# Patient Record
Sex: FEMALE | Race: OTHER | HISPANIC OR LATINO | Employment: UNEMPLOYED | URBAN - METROPOLITAN AREA
[De-identification: names, ages, dates, MRNs, and addresses within clinical notes are randomized per-mention and may not be internally consistent; named-entity substitution may affect disease eponyms.]

---

## 2024-10-04 ENCOUNTER — OFFICE VISIT (OUTPATIENT)
Dept: FAMILY MEDICINE | Facility: CLINIC | Age: 44
End: 2024-10-04
Payer: COMMERCIAL

## 2024-10-04 VITALS
OXYGEN SATURATION: 99 % | WEIGHT: 131.81 LBS | HEIGHT: 63 IN | HEART RATE: 67 BPM | DIASTOLIC BLOOD PRESSURE: 78 MMHG | BODY MASS INDEX: 23.36 KG/M2 | SYSTOLIC BLOOD PRESSURE: 118 MMHG | TEMPERATURE: 98 F

## 2024-10-04 DIAGNOSIS — Z12.31 ENCOUNTER FOR SCREENING MAMMOGRAM FOR BREAST CANCER: ICD-10-CM

## 2024-10-04 DIAGNOSIS — Z00.00 ANNUAL PHYSICAL EXAM: Primary | ICD-10-CM

## 2024-10-04 DIAGNOSIS — M51.361 DEGENERATION OF INTERVERTEBRAL DISC OF LUMBAR REGION WITH LOWER EXTREMITY PAIN: ICD-10-CM

## 2024-10-04 DIAGNOSIS — R53.83 FATIGUE, UNSPECIFIED TYPE: ICD-10-CM

## 2024-10-04 DIAGNOSIS — N94.6 DYSMENORRHEA: ICD-10-CM

## 2024-10-04 PROCEDURE — 99386 PREV VISIT NEW AGE 40-64: CPT | Mod: 25,S$GLB,, | Performed by: FAMILY MEDICINE

## 2024-10-04 PROCEDURE — 1159F MED LIST DOCD IN RCRD: CPT | Mod: CPTII,S$GLB,, | Performed by: FAMILY MEDICINE

## 2024-10-04 PROCEDURE — 3044F HG A1C LEVEL LT 7.0%: CPT | Mod: CPTII,S$GLB,, | Performed by: FAMILY MEDICINE

## 2024-10-04 PROCEDURE — 96372 THER/PROPH/DIAG INJ SC/IM: CPT | Mod: S$GLB,,, | Performed by: FAMILY MEDICINE

## 2024-10-04 PROCEDURE — 3078F DIAST BP <80 MM HG: CPT | Mod: CPTII,S$GLB,, | Performed by: FAMILY MEDICINE

## 2024-10-04 PROCEDURE — 3074F SYST BP LT 130 MM HG: CPT | Mod: CPTII,S$GLB,, | Performed by: FAMILY MEDICINE

## 2024-10-04 PROCEDURE — 99999 PR PBB SHADOW E&M-NEW PATIENT-LVL IV: CPT | Mod: PBBFAC,,, | Performed by: FAMILY MEDICINE

## 2024-10-04 PROCEDURE — 99204 OFFICE O/P NEW MOD 45 MIN: CPT | Mod: 25,S$GLB,, | Performed by: FAMILY MEDICINE

## 2024-10-04 PROCEDURE — 3008F BODY MASS INDEX DOCD: CPT | Mod: CPTII,S$GLB,, | Performed by: FAMILY MEDICINE

## 2024-10-04 PROCEDURE — 1160F RVW MEDS BY RX/DR IN RCRD: CPT | Mod: CPTII,S$GLB,, | Performed by: FAMILY MEDICINE

## 2024-10-04 RX ORDER — FAMOTIDINE 40 MG/1
40 TABLET, FILM COATED ORAL DAILY
Qty: 30 TABLET | Refills: 5 | Status: SHIPPED | OUTPATIENT
Start: 2024-10-04 | End: 2025-10-04

## 2024-10-04 RX ORDER — CELECOXIB 100 MG/1
100 CAPSULE ORAL 2 TIMES DAILY
Qty: 60 CAPSULE | Refills: 5 | Status: SHIPPED | OUTPATIENT
Start: 2024-10-04

## 2024-10-04 RX ORDER — METHYLPREDNISOLONE 4 MG/1
TABLET ORAL
Qty: 21 EACH | Refills: 0 | Status: SHIPPED | OUTPATIENT
Start: 2024-10-04 | End: 2024-10-25

## 2024-10-04 RX ORDER — CYANOCOBALAMIN 1000 UG/ML
1000 INJECTION, SOLUTION INTRAMUSCULAR; SUBCUTANEOUS ONCE
Status: COMPLETED | OUTPATIENT
Start: 2024-10-04 | End: 2024-10-04

## 2024-10-04 RX ADMIN — CYANOCOBALAMIN 1000 MCG: 1000 INJECTION, SOLUTION INTRAMUSCULAR; SUBCUTANEOUS at 03:10

## 2024-10-04 NOTE — PROGRESS NOTES
Patient given B12 1000mcg via injection to L glute per MD order. Tolerated well, 0 complaints of.

## 2024-10-04 NOTE — PROGRESS NOTES
Routine Office Visit     Patient Name: Flako Lake    : 1980  MRN: [id number]    Subjective      - Keegan [id number]    History of Present Illness    CHIEF COMPLAINT:  Annual exam / establish care / new to me   Modified visit - topics discussed outside of annual physical    - Abnormal menstrual bleeding and menopausal symptoms  - Back pain     MENOPAUSE AND MENSTRUAL ISSUES:  She was diagnosed with premenopause at age 37, with a family history of early menopause (sisters at ages 40 and 41). Now 44, she experiences perimenopause symptoms including hot flashes, night sweats, and mood swings. She reports difficulty sleeping due to alternating hot and cold sensations at night and significant mood fluctuations. Her menstrual cycles are irregular with alternating heavy and light periods. She reports current heavy bleeding which began yesterday, with her last period occurring three months ago and lasting only 1-2 hours. She previously tried birth control in [facility] at age 28 but discontinued after two months due to poor tolerance. Homeopathic medicine tried in [place] for menopausal symptoms was ineffective.    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 An MRI in [place] about a year ago revealed inflamed nerves causing disc compression. She experiences intermittent symptoms exacerbated by climbing stairs, walking forcefully, or wearing high heels. She reports numbness in her legs starting a year ago and recent onset of coldness from feet to hips. B12 injections have been helpful in managing symptoms. She denies problems with urination or bowel movements. Family history of similar spinal issues affects her father and siblings. Symptoms began at age 15 or 16.    PAIN MANAGEMENT:  She has been using naproxen for pain management but reports stomach upset, even with gastric protectors. She is  "seeking an alternative pain medication. During a previous hospitalization, she received IV fentanyl causing significant sedation for 4-5 days, along with steroid treatment.    CURRENT MEDICATIONS AND SUPPLEMENTS:  She takes omega-3 and biotin supplements intermittently, typically finishing one bottle before stopping. She also mentions taking unspecified vitamins.    LIFESTYLE AND SOCIAL HISTORY:  Her diet consists primarily of fish, avoiding meat and chicken. She engages in daily 15-20 minute walks with her dog but is unable to use a treadmill due to spine issues. She denies alcohol consumption and is a non-smoker. She frequently travels due to her 's job in [employer], having previously lived in [place] and [place]. She plans to remain in her current location for 4-5 years. She takes annual trips to [place] for medical testing including hematology, cytology, and mammograms.    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    ALLERGIES:  She denies any known allergies.      ROS:  General: no fever, no chills, no fatigue, no weight gain, no weight loss, +night sweats  Eyes: no vision changes, no redness, no discharge  ENT: no ear pain, no nasal congestion, no sore throat  Cardiovascular: no chest pain, no palpitations, no lower extremity edema  Respiratory: no cough, no shortness of breath  Gastrointestinal: no abdominal pain, no nausea, no vomiting, no diarrhea, no constipation, no blood in stool  Genitourinary: no dysuria, no hematuria, no frequency  Musculoskeletal: no joint pain, no muscle pain  Skin: no rash, no lesion  Neurological: no headache, no dizziness, +numbness, no tingling  Psychiatric: no anxiety, no depression, +sleep difficulty  Allergic: no seasonal allergies           Objective     /78   Pulse 67   Temp 98 °F (36.7 °C) (Oral)   Ht 5' 3" (1.6 m)   Wt 59.8 kg (131 lb 13.4 oz)   LMP 10/03/2024 (Exact Date)   SpO2 99%   BMI 23.35 kg/m² "   Physical Exam  Constitutional:       Appearance: She is well-developed.   HENT:      Head: Normocephalic and atraumatic.   Eyes:      Conjunctiva/sclera: Conjunctivae normal.      Pupils: Pupils are equal, round, and reactive to light.   Cardiovascular:      Rate and Rhythm: Normal rate and regular rhythm.      Heart sounds: Normal heart sounds. No murmur heard.     No friction rub. No gallop.   Pulmonary:      Effort: No respiratory distress.      Breath sounds: Normal breath sounds.   Abdominal:      General: Bowel sounds are normal. There is no distension.      Palpations: Abdomen is soft.      Tenderness: There is no abdominal tenderness.   Musculoskeletal:         General: Normal range of motion.      Cervical back: Normal range of motion and neck supple.   Lymphadenopathy:      Cervical: No cervical adenopathy.   Skin:     General: Skin is warm.   Neurological:      Mental Status: She is alert and oriented to person, place, and time.           Assessment     Assessment & Plan      OSTEOPOROSIS RISK:   Discussed potential link between early menopause and osteoporosis.    EXERCISE:   Patient to continue current walking routine of 15-20 minutes per day with dog.    NUTRITION:   Patient to maintain current healthy diet, including fish consumption.    VITAMIN B12 DEFICIENCY:   Started B12 injection to be administered once in office for symptom management.    ABNORMAL UTERINE BLEEDING:   Referred to OB/GYN for Pap smear and further evaluation of abnormal bleeding.            Problem List Items Addressed This Visit    None  Visit Diagnoses       Annual physical exam    -  Primary    Relevant Orders    CBC Auto Differential (Completed)    Comprehensive Metabolic Panel (Completed)    Lipid Panel (Completed)    Hemoglobin A1C (Completed)    TSH (Completed)    Hepatitis C Antibody (Completed)    HIV 1/2 Ag/Ab (4th Gen) (Completed)    Ambulatory referral/consult to Obstetrics / Gynecology  I addressed all major concerns  as it related to health maintenance.  All were ordered and scheduled based on the patients wishes.  Any additional health maintenance will be readdressed at the next physical if declined or deferred by the patient.       Dysmenorrhea        Relevant Orders    LUTEINIZING HORMONE (Completed)    FOLLICLE STIMULATING HORMONE (Completed)  Ambulatory referral/consult to Obstetrics / Gynecology   Considered early menopause diagnosis at age 37, noting family history and current symptoms including hot flashes and mood changes      Encounter for screening mammogram for breast cancer        Relevant Orders    Mammo Digital Screening Bilat w/ Maged    Degeneration of intervertebral disc of lumbar region with lower extremity pain        Relevant Medications    celecoxib (CELEBREX) 100 MG capsule    famotidine (PEPCID) 40 MG tablet    methylPREDNISolone (MEDROL DOSEPACK) 4 mg tablet   Started Celebrex as an alternative to naproxen for pain management.   Started oral steroids, to be taken only if needed for severe back pain.   Explained that nerve compression may be causing leg coldness and numbness.   Assessed chronic back pain and numbness in legs, potentially related to spinal disc issues and nerve compression      Fatigue, unspecified type        Relevant Medications    cyanocobalamin injection 1,000 mcg (Completed)            FOLLOW UP:   Follow up in 1st week of December to review test results and provide documentation for the patient's doctor in [place].   Contact the office if back pain worsens or if steroids are needed.     Greater than 45 minutes was spent with this patient with greater than 50% spent with face-to-face counseling     This note was generated with the assistance of ambient listening technology. Verbal consent was obtained by the patient and accompanying visitor(s) for the recording of patient appointment to facilitate this note. I attest to having reviewed and edited the generated note for accuracy, though  some syntax or spelling errors may persist. Please contact the author of this note for any clarification.

## 2024-10-08 ENCOUNTER — HOSPITAL ENCOUNTER (OUTPATIENT)
Dept: RADIOLOGY | Facility: HOSPITAL | Age: 44
Discharge: HOME | End: 2024-10-08
Attending: FAMILY MEDICINE
Payer: COMMERCIAL

## 2024-10-08 DIAGNOSIS — Z12.31 ENCOUNTER FOR SCREENING MAMMOGRAM FOR BREAST CANCER: ICD-10-CM

## 2024-10-08 PROCEDURE — 77067 SCR MAMMO BI INCL CAD: CPT | Mod: TC,PO

## 2024-10-15 NOTE — PROGRESS NOTES
HISTORY OF PRESENT ILLNESS:    Flako Lake is a 44 y.o. female, , Patient's last menstrual period was 10/03/2024 (exact date).,  presents for a routine exam. She uses nothing for contraception. She does not want STD screening.  Reports GYN complaints.  Reports hot flashes since age 37. Mom went through menopause at 45, sisters ate 40 and 50. She is still having monthly, regular cycles.   The patient participates in regular exercise: yes.  The patient does not smoke.  The patient wears seatbelts.   Pt denies any domestic violence. no -  tattoos or blood transfusions    SCREENING:   Pap: due   Mammogram:  , TC Score: 6.67 %   Colonoscopy: N/A   DEXA:  N/A     GYN FH:  Breast cancer: none  Colon cancer: none  Ovarian cancer: none  Endometrial cancer: none      PCP VISIT   She was diagnosed with premenopause at age 37, with a family history of early menopause (sisters at ages 40 and 41). Now 44, she experiences perimenopause symptoms including hot flashes, night sweats, and mood swings. She reports difficulty sleeping due to alternating hot and cold sensations at night and significant mood fluctuations. Her menstrual cycles are irregular with alternating heavy and light periods. She reports current heavy bleeding which began yesterday, with her last period occurring three months ago and lasting only 1-2 hours. She previously tried birth control in [facility] at age 28 but discontinued after two months due to poor tolerance. Homeopathic medicine tried in [place] for menopausal symptoms was ineffective.    History reviewed. No pertinent past medical history.    History reviewed. No pertinent surgical history.    MEDICATIONS AND ALLERGIES:      Current Outpatient Medications:     celecoxib (CELEBREX) 100 MG capsule, Take 1 capsule (100 mg total) by mouth 2 (two) times daily., Disp: 60 capsule, Rfl: 5    famotidine (PEPCID) 40 MG tablet, Take 1 tablet (40 mg total) by mouth once daily., Disp: 30 tablet, Rfl:  5    methylPREDNISolone (MEDROL DOSEPACK) 4 mg tablet, use as directed, Disp: 21 each, Rfl: 0    Review of patient's allergies indicates:  No Known Allergies    Social History     Socioeconomic History    Marital status: Unknown   Tobacco Use    Smoking status: Never    Smokeless tobacco: Never     Social Drivers of Health     Financial Resource Strain: Low Risk  (9/27/2024)    Overall Financial Resource Strain (CARDIA)     Difficulty of Paying Living Expenses: Not very hard   Food Insecurity: No Food Insecurity (9/27/2024)    Hunger Vital Sign     Worried About Running Out of Food in the Last Year: Never true     Ran Out of Food in the Last Year: Never true   Physical Activity: Insufficiently Active (9/27/2024)    Exercise Vital Sign     Days of Exercise per Week: 7 days     Minutes of Exercise per Session: 20 min   Stress: No Stress Concern Present (9/27/2024)    [employer] [place] [employer] - Occupational Stress Questionnaire     Feeling of Stress : Not at all   Housing Stability: Unknown (9/27/2024)    Housing Stability Vital Sign     Unable to Pay for Housing in the Last Year: No       COMPREHENSIVE GYN HISTORY:    PAP History: Denies abnormal Paps.  Infection History: Denies STDs. Denies PID.  Benign History: Denies uterine fibroids. Denies ovarian cysts. Denies endometriosis. Denies other conditions.  Cancer History: Denies cervical cancer. Denies uterine cancer or hyperplasia. Denies ovarian cancer. Denies vulvar cancer or pre-cancer. Denies vaginal cancer or pre-cancer. Denies breast cancer. Denies colon cancer.  Sexual Activity History: Reports currently being sexually active  Menstrual History: Monthly, mild-moderate.  Contraception:no method    ROS:  GENERAL: No weight changes. No swelling. No fatigue. No fever.  CARDIOVASCULAR: No chest pain. No shortness of breath. No leg cramps.   NEUROLOGICAL: No headaches. No vision changes.  BREASTS: No pain. No lumps. No discharge.  ABDOMEN: No pain.  No nausea. No vomiting. No diarrhea. No constipation.  REPRODUCTIVE: No abnormal bleeding.   VULVA: No pain. No lesions. No itching.  VAGINA: No relaxation. No itching. No odor. No discharge. No lesions.  URINARY: No incontinence. No nocturia. No frequency. No dysuria.    /78   Wt 58 kg (127 lb 13.9 oz)   LMP 10/03/2024 (Exact Date)   BMI 22.65 kg/m²     PE:  APPEARANCE: Well nourished, well developed, in no acute distress.  AFFECT: WNL, alert and oriented x 3.  SKIN: No acne or hirsutism.  NECK: Neck symmetric, without masses or thyromegaly.  NODES: No inguinal, cervical, axillary or femoral lymph node enlargement.  CHEST: Good respiratory effort.   ABDOMEN: Soft. No tenderness or masses. No hepatosplenomegaly. No hernias.  BREASTS: Symmetrical, no skin changes, visible lesions, palpable masses or nipple discharge bilaterally.  PELVIC: External female genitalia without lesions.  Female hair distribution. Adequate perineal body, Normal urethral meatus. Vagina moist and well rugated without lesions or discharge.  No significant cystocele or rectocele present. Cervix pink without lesions, discharge or tenderness. Uterus is normal size, regular, mobile and nontender. Adnexa without masses or tenderness.  EXTREMITIES: No edema      DIAGNOSIS:  1. Women's annual routine gynecological examination        2. Screening breast examination        3. Encounter for Papanicolaou smear for cervical cancer screening  Liquid-Based Pap Smear, Screening      4. Screening for human papillomavirus (HPV)  HPV High Risk Genotypes, PCR      5. Perimenopausal        6. Annual physical exam  Ambulatory referral/consult to Obstetrics / Gynecology      7. Encounter for initial prescription of implantable subdermal contraceptive  Device Authorization Order          PLAN:  - Pap and HPV done today.  - Screening tests as ordered.  - Diet and exercise encouraged.  Seat belt use encouraged.  Reviewed ASCCP Pap guidelines and screening  recommendations.    1. Women's annual routine gynecological examination (Primary)  2. Screening breast examination  3. Encounter for Papanicolaou smear for cervical cancer screening  - Liquid-Based Pap Smear, Screening    4. Screening for human papillomavirus (HPV)  - HPV High Risk Genotypes, PCR    5. Perimenopausal  - Non-Hormonal Menopausal Supplements:   First line of treatment for postmenopausal hot flushes is treatment with dietary and over-the-counter phytoestrogens such as soy-based products.  Estroven or Healthy Woman Soy formulations can reduce the number of and severity of hot flushes.  You can also add soy into your diet in the form of endemame, tofu, and soy milk.  Most patients report improvement but not complete resolution of their menopausal hot flushes and night sweats.  There are a variety of plant-based therapies used for hot flashes including isoflavones, phytoestrogens, and herbal therapies. Two types of isoflavones are found in soybeans, chickpeas, and lentils. Lignans (eg, enterolactone and enterodiol) are found in flaxseed, lentils, grains, fruits, and vegetables.   Black cohosh   Evening primrose oil   - offered OCPs, pt declined. Discussed other options, will try OTC options first      6. Annual physical exam  - Ambulatory referral/consult to Obstetrics / Gynecology    7. Encounter for initial prescription of implantable subdermal contraceptive  - Device Authorization Order - nexplanon       Counseling: indications for and frequency of periodic gynecologic exam    The patient was counseled today on ACS PAP guidelines, with recommendations for yearly pelvic exams unless their uterus, cervix, and ovaries were removed for benign reasons; in that case, examinations every 3-5 years are recommended.  The patient was also counseled regarding monthly breast self-examination, routine STD screening for at-risk populations, prophylactic immunizations for transmitted infections such as  HPV, Pertussis,  or Influenza as appropriate, and yearly mammograms when indicated by ACS guidelines.  She was advised to see her primary care physician for all other health maintenance.    FOLLOW-UP with me annually.   Emma Zuniga PA-C

## 2024-10-16 ENCOUNTER — OFFICE VISIT (OUTPATIENT)
Dept: OBSTETRICS AND GYNECOLOGY | Facility: CLINIC | Age: 44
End: 2024-10-16
Payer: COMMERCIAL

## 2024-10-16 VITALS
DIASTOLIC BLOOD PRESSURE: 78 MMHG | BODY MASS INDEX: 22.65 KG/M2 | SYSTOLIC BLOOD PRESSURE: 110 MMHG | WEIGHT: 127.88 LBS

## 2024-10-16 DIAGNOSIS — Z11.51 SCREENING FOR HUMAN PAPILLOMAVIRUS (HPV): ICD-10-CM

## 2024-10-16 DIAGNOSIS — Z12.39 SCREENING BREAST EXAMINATION: ICD-10-CM

## 2024-10-16 DIAGNOSIS — Z00.00 ANNUAL PHYSICAL EXAM: ICD-10-CM

## 2024-10-16 DIAGNOSIS — Z12.4 ENCOUNTER FOR PAPANICOLAOU SMEAR FOR CERVICAL CANCER SCREENING: ICD-10-CM

## 2024-10-16 DIAGNOSIS — N95.1 PERIMENOPAUSAL: ICD-10-CM

## 2024-10-16 DIAGNOSIS — Z30.017 ENCOUNTER FOR INITIAL PRESCRIPTION OF IMPLANTABLE SUBDERMAL CONTRACEPTIVE: ICD-10-CM

## 2024-10-16 DIAGNOSIS — Z01.419 WOMEN'S ANNUAL ROUTINE GYNECOLOGICAL EXAMINATION: Primary | ICD-10-CM

## 2024-10-16 PROCEDURE — 3044F HG A1C LEVEL LT 7.0%: CPT | Mod: CPTII,S$GLB,, | Performed by: PHYSICIAN ASSISTANT

## 2024-10-16 PROCEDURE — 87624 HPV HI-RISK TYP POOLED RSLT: CPT | Performed by: PHYSICIAN ASSISTANT

## 2024-10-16 PROCEDURE — 1160F RVW MEDS BY RX/DR IN RCRD: CPT | Mod: CPTII,S$GLB,, | Performed by: PHYSICIAN ASSISTANT

## 2024-10-16 PROCEDURE — 3078F DIAST BP <80 MM HG: CPT | Mod: CPTII,S$GLB,, | Performed by: PHYSICIAN ASSISTANT

## 2024-10-16 PROCEDURE — 99386 PREV VISIT NEW AGE 40-64: CPT | Mod: S$GLB,,, | Performed by: PHYSICIAN ASSISTANT

## 2024-10-16 PROCEDURE — 99999 PR PBB SHADOW E&M-EST. PATIENT-LVL III: CPT | Mod: PBBFAC,,, | Performed by: PHYSICIAN ASSISTANT

## 2024-10-16 PROCEDURE — 3074F SYST BP LT 130 MM HG: CPT | Mod: CPTII,S$GLB,, | Performed by: PHYSICIAN ASSISTANT

## 2024-10-16 PROCEDURE — 88175 CYTOPATH C/V AUTO FLUID REDO: CPT | Performed by: PHYSICIAN ASSISTANT

## 2024-10-16 PROCEDURE — 1159F MED LIST DOCD IN RCRD: CPT | Mod: CPTII,S$GLB,, | Performed by: PHYSICIAN ASSISTANT

## 2024-10-16 PROCEDURE — 3008F BODY MASS INDEX DOCD: CPT | Mod: CPTII,S$GLB,, | Performed by: PHYSICIAN ASSISTANT

## 2024-10-17 LAB
CLINICAL INFO: NORMAL
DATE OF PREVIOUS PAP: NORMAL
DATE PREVIOUS BX: NO
LMP START DATE: NORMAL
SPECIMEN SOURCE CVX/VAG CYTO: NORMAL

## 2024-10-29 ENCOUNTER — PROCEDURE VISIT (OUTPATIENT)
Dept: OBSTETRICS AND GYNECOLOGY | Facility: CLINIC | Age: 44
End: 2024-10-29
Payer: COMMERCIAL

## 2024-10-29 VITALS
WEIGHT: 127.88 LBS | SYSTOLIC BLOOD PRESSURE: 110 MMHG | DIASTOLIC BLOOD PRESSURE: 80 MMHG | BODY MASS INDEX: 22.65 KG/M2

## 2024-10-29 DIAGNOSIS — Z30.017 NEXPLANON INSERTION: Primary | ICD-10-CM

## 2024-10-29 DIAGNOSIS — Z97.5 NEXPLANON IN PLACE: ICD-10-CM

## 2024-10-29 LAB
B-HCG UR QL: NEGATIVE
CTP QC/QA: YES

## 2024-10-29 PROCEDURE — 99499 UNLISTED E&M SERVICE: CPT | Mod: S$GLB,,, | Performed by: PHYSICIAN ASSISTANT

## 2024-10-29 PROCEDURE — 11981 INSERTION DRUG DLVR IMPLANT: CPT | Mod: S$GLB,,, | Performed by: PHYSICIAN ASSISTANT

## 2024-10-29 PROCEDURE — 81025 URINE PREGNANCY TEST: CPT | Mod: S$GLB,,, | Performed by: PHYSICIAN ASSISTANT
